# Patient Record
Sex: FEMALE | Race: BLACK OR AFRICAN AMERICAN | Employment: FULL TIME | URBAN - METROPOLITAN AREA
[De-identification: names, ages, dates, MRNs, and addresses within clinical notes are randomized per-mention and may not be internally consistent; named-entity substitution may affect disease eponyms.]

---

## 2017-04-04 ENCOUNTER — TELEPHONE (OUTPATIENT)
Dept: OBGYN CLINIC | Facility: CLINIC | Age: 47
End: 2017-04-04

## 2017-04-14 ENCOUNTER — OFFICE VISIT (OUTPATIENT)
Dept: OBGYN CLINIC | Facility: CLINIC | Age: 47
End: 2017-04-14

## 2017-04-14 VITALS
SYSTOLIC BLOOD PRESSURE: 123 MMHG | BODY MASS INDEX: 35 KG/M2 | DIASTOLIC BLOOD PRESSURE: 80 MMHG | HEART RATE: 86 BPM | WEIGHT: 219 LBS

## 2017-04-14 DIAGNOSIS — Z12.39 BREAST CANCER SCREENING: Primary | ICD-10-CM

## 2017-04-14 DIAGNOSIS — Z11.3 SCREENING FOR STD (SEXUALLY TRANSMITTED DISEASE): ICD-10-CM

## 2017-04-14 DIAGNOSIS — N89.8 VAGINAL IRRITATION: ICD-10-CM

## 2017-04-14 PROCEDURE — 99214 OFFICE O/P EST MOD 30 MIN: CPT | Performed by: OBSTETRICS & GYNECOLOGY

## 2017-04-14 RX ORDER — FLUCONAZOLE 150 MG/1
150 TABLET ORAL
Qty: 2 TABLET | Refills: 2 | Status: SHIPPED | OUTPATIENT
Start: 2017-04-14 | End: 2018-04-14

## 2017-04-14 RX ORDER — ACETAMINOPHEN AND CODEINE PHOSPHATE 120; 12 MG/5ML; MG/5ML
0.35 SOLUTION ORAL DAILY
Qty: 1 PACKAGE | Refills: 11 | Status: SHIPPED | OUTPATIENT
Start: 2017-04-14 | End: 2018-04-14

## 2017-04-14 RX ORDER — VALACYCLOVIR HYDROCHLORIDE 500 MG/1
500 TABLET, FILM COATED ORAL 2 TIMES DAILY
Qty: 6 TABLET | Refills: 3 | Status: SHIPPED | OUTPATIENT
Start: 2017-04-14 | End: 2017-04-17

## 2017-04-14 NOTE — PROGRESS NOTES
Gavino Hernandez is a 48year old female  Patient's last menstrual period was 2017. Patient presents with:  Gyn Exam: annual exam and mammo order   Patient presents today for problem visit.  She was seen in  for annual exam. She is concerned tobacco: Not on file    Alcohol Use: Yes  0.0 oz/week    0 Standard drinks or equivalent per week         Comment: 1-5 glasses of wine per week; occasionally    Drug Use: No    Comment: none    Sexual Activity: Yes    Partners: Male    Birth Control/ Prote nondistended, no masses  Skin/Hair: no unusual rashes or bruises  Extremities: no edema, no cyanosis  Psychiatric:  Oriented to time, place, person and situation.  Appropriate mood and affect    Pelvic Exam:  External Genitalia: normal appearance, hair dist yeast  3. Mammogram ordered today  4. Last pap smear 2016 and negative  5. Started on Mini-pill for menses that are light but irregular. This will make her traveling easier. Menses are spaced and likely due to menopause  6.  Vaginal culture today and STD sc

## 2017-04-17 ENCOUNTER — TELEPHONE (OUTPATIENT)
Dept: OBGYN CLINIC | Facility: CLINIC | Age: 47
End: 2017-04-17

## 2017-04-17 RX ORDER — METRONIDAZOLE 500 MG/1
500 TABLET ORAL 2 TIMES DAILY
Qty: 14 TABLET | Refills: 0 | Status: SHIPPED | OUTPATIENT
Start: 2017-04-17 | End: 2017-04-24

## 2017-04-17 NOTE — TELEPHONE ENCOUNTER
----- Message from Claudia Montano MD sent at 4/17/2017  9:09 AM CDT -----  Please let pt know that her GC/Ct was negative. Her vaginal culture was positive for BV. She will need Flagyl 500mg BID for 7 days.

## 2017-04-17 NOTE — TELEPHONE ENCOUNTER
Pt notified of results and rx. Pt advised to avoid ETOH while on tx and about 3 days after completing Flagyl.

## 2017-05-26 ENCOUNTER — TELEPHONE (OUTPATIENT)
Dept: OBGYN CLINIC | Facility: CLINIC | Age: 47
End: 2017-05-26

## 2017-05-26 NOTE — TELEPHONE ENCOUNTER
Pt calling to report that she started Ortho Micronor in April and since starting has been experiencing headaches, fatigue, and numbness in her right leg.  Pt stated that at first she thought the numbness in her leg was due to her work outs, but then it star

## 2017-05-26 NOTE — TELEPHONE ENCOUNTER
Please let patient know that leg numbness is not a side effect of micronor. She should see her PCP immediately as there is likely another cause of this. Some of it may be due to her traveling for long hours for work.  Headaches can be a side effect of any b

## 2017-05-26 NOTE — TELEPHONE ENCOUNTER
Had Severe headache, numbness and fatigue with her b/c, pt stop taking it last week.  Would like to know if she can take a different kind ( no symptoms  now )

## 2017-07-22 ENCOUNTER — HOSPITAL ENCOUNTER (OUTPATIENT)
Dept: MAMMOGRAPHY | Facility: HOSPITAL | Age: 47
Discharge: HOME | End: 2017-07-22
Attending: OBSTETRICS & GYNECOLOGY
Payer: COMMERCIAL

## 2017-07-22 DIAGNOSIS — Z12.39 BREAST CANCER SCREENING: ICD-10-CM

## 2017-07-22 PROCEDURE — 77067 SCR MAMMO BI INCL CAD: CPT | Performed by: OBSTETRICS & GYNECOLOGY

## 2017-07-24 ENCOUNTER — TELEPHONE (OUTPATIENT)
Dept: OBGYN CLINIC | Facility: CLINIC | Age: 47
End: 2017-07-24

## 2017-07-24 NOTE — TELEPHONE ENCOUNTER
Noted. Please apologize to patient for me and will discuss with radiology but not uncommon to need additional views.

## 2017-07-24 NOTE — TELEPHONE ENCOUNTER
Pt states she was contacted today by radiology about the follow up mammo. Pt would like [address] to know that when she had her initial mammo, she felt like it was an assembly line.   Pt states she was in and out in 5 minutes, and that it was completely painless

## 2017-07-24 NOTE — TELEPHONE ENCOUNTER
----- Message from Manuel Lyon MD sent at 7/23/2017  1:43 PM CDT -----  Please make sure patient knows she needs repeat images and these orders have been placed for mammogram

## 2017-08-04 ENCOUNTER — HOSPITAL ENCOUNTER (OUTPATIENT)
Dept: ULTRASOUND IMAGING | Facility: HOSPITAL | Age: 47
Discharge: HOME | End: 2017-08-04
Attending: OBSTETRICS & GYNECOLOGY
Payer: COMMERCIAL

## 2017-08-04 ENCOUNTER — HOSPITAL ENCOUNTER (OUTPATIENT)
Dept: MAMMOGRAPHY | Facility: HOSPITAL | Age: 47
Discharge: HOME | End: 2017-08-04
Attending: OBSTETRICS & GYNECOLOGY
Payer: COMMERCIAL

## 2017-08-04 DIAGNOSIS — R92.8 ABNORMAL MAMMOGRAM: ICD-10-CM

## 2017-08-04 PROCEDURE — 76642 ULTRASOUND BREAST LIMITED: CPT | Performed by: OBSTETRICS & GYNECOLOGY

## 2017-08-04 PROCEDURE — 77066 DX MAMMO INCL CAD BI: CPT | Performed by: OBSTETRICS & GYNECOLOGY

## 2017-10-30 ENCOUNTER — TELEPHONE (OUTPATIENT)
Dept: OBGYN CLINIC | Facility: CLINIC | Age: 47
End: 2017-10-30

## 2017-10-30 NOTE — TELEPHONE ENCOUNTER
RECEIVED REFILL REQUEST FROM [facility] FOR FLUCONAZOLE 150MG TAB AND FOR VALACYCLOVIR 500MG TAB. FLUCONAZOLE WAS PRESCRIBED ON 4-14-17 FOR 2 TABS WITH 2 REFILLS AND VALACYCLOVIR 500MG TABS, #6 WITH 3 REFILLS.

## 2017-10-30 NOTE — TELEPHONE ENCOUNTER
LMTCB.  NEED TO FIND OUT IF SHE USED ALL THE ORIGINAL REFILLS.   HOW MANY OUTBREAKS IN THE PAST YEAR?  (FORM IN BLACK BIN BY FAX)

## 2017-10-31 NOTE — TELEPHONE ENCOUNTER
Spoke with ROCHELLE (on call) in [place] absence. Verbal from G[facility] [phone] that pt may have refill of valtrex 500 mg #6 for outbreak. Called pt and informed her of recs. Apologized to pt for the delay in response.  Asked pt which pharmacy she needs rx sent to since she is o

## 2017-10-31 NOTE — TELEPHONE ENCOUNTER
Pt states her insurance requires after 2 refills to go through mail order. 3rd break right now and has not used all refills. [place] is [place] of [place]. pls adv.

## 2017-10-31 NOTE — TELEPHONE ENCOUNTER
Pt is returning nurse call- would like speak to dr Yessenia Umanzor  now. Pt is very upset to regarding her refills, will call the [address] today. She will write a yelp review, on the clinic.  Pt is very up set because she is having a break out- is out in [place] not feelin

## 2017-11-18 RX ORDER — VALACYCLOVIR HYDROCHLORIDE 500 MG/1
TABLET, FILM COATED ORAL
Qty: 6 TABLET | Refills: 0 | OUTPATIENT
Start: 2017-11-18

## 2018-04-14 ENCOUNTER — OFFICE VISIT (OUTPATIENT)
Dept: OBGYN CLINIC | Facility: CLINIC | Age: 48
End: 2018-04-14

## 2018-04-14 VITALS
DIASTOLIC BLOOD PRESSURE: 79 MMHG | SYSTOLIC BLOOD PRESSURE: 120 MMHG | HEART RATE: 73 BPM | WEIGHT: 224 LBS | HEIGHT: 67.25 IN | BODY MASS INDEX: 34.75 KG/M2

## 2018-04-14 DIAGNOSIS — Z12.31 ENCOUNTER FOR SCREENING MAMMOGRAM FOR BREAST CANCER: ICD-10-CM

## 2018-04-14 DIAGNOSIS — Z01.419 ENCOUNTER FOR GYNECOLOGICAL EXAMINATION WITHOUT ABNORMAL FINDING: Primary | ICD-10-CM

## 2018-04-14 PROCEDURE — 99396 PREV VISIT EST AGE 40-64: CPT | Performed by: OBSTETRICS & GYNECOLOGY

## 2018-04-14 RX ORDER — SIMETHICONE 125 MG
CAPSULE ORAL
COMMUNITY
Start: 2016-10-31 | End: 2018-04-14

## 2018-04-14 RX ORDER — FLUOCINOLONE ACETONIDE 0.25 MG/G
OINTMENT TOPICAL
COMMUNITY
Start: 2018-01-05

## 2018-04-14 RX ORDER — VALACYCLOVIR HYDROCHLORIDE 500 MG/1
500 TABLET, FILM COATED ORAL 2 TIMES DAILY
Qty: 12 TABLET | Refills: 12 | Status: SHIPPED | OUTPATIENT
Start: 2018-04-14 | End: 2018-04-17

## 2018-04-14 RX ORDER — FLUCONAZOLE 150 MG/1
150 TABLET ORAL
Qty: 2 TABLET | Refills: 10 | Status: SHIPPED | OUTPATIENT
Start: 2018-04-14

## 2018-04-14 RX ORDER — KETOCONAZOLE 20 MG/ML
SHAMPOO TOPICAL
COMMUNITY
Start: 2018-01-05

## 2018-04-14 NOTE — PROGRESS NOTES
Well Woman Exam    HPI:  The patient is a 50yo female who presents for annual exam. She has been traveling even more for work. She was in [address] [place] [address] this week and goes to [facility] later this month. Buying a Loft in the [address].  She is doing well with no menses Problem Relation Age of Onset   • Diabetes Mother    • Hypertension Mother    • Cancer Mother      cervical   • Heart Attack Mother      myocardial infarction   • Diabetes Maternal Grandmother    • Diabetes Brother    • Colon Cancer Paternal Grandfather ascultation bilaterally   Lymphatic:no abnormal supraclavicular or axillary adenopathy is noted  Breast: normal without palpable masses, tenderness, asymmetry, nipple discharge, nipple retraction or skin changes  Abdomen:  soft, nontender, nondistended, no

## 2018-07-28 ENCOUNTER — HOSPITAL ENCOUNTER (OUTPATIENT)
Dept: MAMMOGRAPHY | Facility: HOSPITAL | Age: 48
Discharge: HOME | End: 2018-07-28
Attending: OBSTETRICS & GYNECOLOGY
Payer: COMMERCIAL

## 2018-07-28 DIAGNOSIS — Z12.31 ENCOUNTER FOR SCREENING MAMMOGRAM FOR BREAST CANCER: ICD-10-CM

## 2018-07-28 PROCEDURE — 77067 SCR MAMMO BI INCL CAD: CPT | Performed by: OBSTETRICS & GYNECOLOGY

## 2018-07-31 ENCOUNTER — PATIENT MESSAGE (OUTPATIENT)
Dept: OBGYN CLINIC | Facility: CLINIC | Age: 48
End: 2018-07-31

## 2018-07-31 RX ORDER — VALACYCLOVIR HYDROCHLORIDE 500 MG/1
500 TABLET, FILM COATED ORAL 2 TIMES DAILY
Qty: 10 TABLET | Refills: 5 | Status: SHIPPED | OUTPATIENT
Start: 2018-07-31 | End: 2019-06-19

## 2018-07-31 RX ORDER — FLUCONAZOLE 150 MG/1
150 TABLET ORAL
Qty: 2 TABLET | Refills: 10
Start: 2018-07-31

## 2018-07-31 NOTE — TELEPHONE ENCOUNTER
Patient sent valtrex and if she continues to have outbreaks will need to be on suppression dosing. At this time it sounds as though that was prelim research and sensationalized by the media.  There are many links to alzheimers that are being studied and H

## 2018-07-31 NOTE — TELEPHONE ENCOUNTER
From: Olegario Nguyen  To: Tess Gtz MD  Sent: 7/31/2018 3:29 PM CDT  Subject: Prescription Question    Hi Dr. Luz Elena Newell,  Would you mind refilling my Valtrex Rx through [facility]? I've been under a lot of stress and I'm noticing more outbreaks.    A

## 2019-06-19 ENCOUNTER — OFFICE VISIT (OUTPATIENT)
Dept: OBGYN CLINIC | Facility: CLINIC | Age: 49
End: 2019-06-19
Payer: COMMERCIAL

## 2019-06-19 VITALS
SYSTOLIC BLOOD PRESSURE: 115 MMHG | DIASTOLIC BLOOD PRESSURE: 78 MMHG | BODY MASS INDEX: 32.32 KG/M2 | WEIGHT: 208.38 LBS | HEIGHT: 67.5 IN | HEART RATE: 66 BPM

## 2019-06-19 DIAGNOSIS — Z12.31 ENCOUNTER FOR SCREENING MAMMOGRAM FOR BREAST CANCER: ICD-10-CM

## 2019-06-19 DIAGNOSIS — Z01.419 ENCOUNTER FOR GYNECOLOGICAL EXAMINATION WITHOUT ABNORMAL FINDING: Primary | ICD-10-CM

## 2019-06-19 DIAGNOSIS — Z12.4 CERVICAL CANCER SCREENING: ICD-10-CM

## 2019-06-19 DIAGNOSIS — A60.04 HERPES SIMPLEX VULVOVAGINITIS: ICD-10-CM

## 2019-06-19 PROCEDURE — 99396 PREV VISIT EST AGE 40-64: CPT | Performed by: OBSTETRICS & GYNECOLOGY

## 2019-06-19 RX ORDER — VALACYCLOVIR HYDROCHLORIDE 500 MG/1
500 TABLET, FILM COATED ORAL 2 TIMES DAILY
Qty: 10 TABLET | Refills: 5 | Status: SHIPPED | OUTPATIENT
Start: 2019-06-19

## 2019-06-19 RX ORDER — VALACYCLOVIR HYDROCHLORIDE 1 G/1
TABLET, FILM COATED ORAL
Refills: 2 | COMMUNITY
Start: 2019-02-08

## 2019-06-19 NOTE — PROGRESS NOTES
Well Woman Exam    HPI:  The patient is a 50yo female who presents today for annual exam. She quit her job and will not be traveling as much. She is going to work [employer] with [employer]. She has no complaints. No HSV outbreaks.  She feels well with occasional.    Rev Partners: Male        Comment: none    Lifestyle      Physical activity:        Days per week: Not on file        Minutes per session: Not on file      Stress: Not on file    Relationships      Social connections:        Talks on phone: Not on file valACYclovir HCl 500 MG Oral Tab, Take 1 tablet (500 mg total) by mouth 2 (two) times daily. , Disp: 10 tablet, Rfl: 5  •  Fluocinolone Acetonide 0.025 % External Ointment, Apply topically. , Disp: , Rfl:   •  Ketoconazole 2 % External Shampoo, Apply topical normal in size, location, without lesions and prolapse  Bladder:  No fullness, masses or tenderness  Vagina:  Normal appearance without lesions, no abnormal discharge  Cervix:  Normal without tenderness on motion  Uterus: normal in size, contour, position,

## 2019-09-28 ENCOUNTER — HOSPITAL ENCOUNTER (OUTPATIENT)
Dept: MAMMOGRAPHY | Facility: HOSPITAL | Age: 49
Discharge: HOME | End: 2019-09-28
Attending: OBSTETRICS & GYNECOLOGY
Payer: COMMERCIAL

## 2019-09-28 DIAGNOSIS — Z12.31 ENCOUNTER FOR SCREENING MAMMOGRAM FOR BREAST CANCER: ICD-10-CM

## 2019-09-28 PROCEDURE — 77067 SCR MAMMO BI INCL CAD: CPT | Performed by: OBSTETRICS & GYNECOLOGY

## 2019-09-28 PROCEDURE — 77063 BREAST TOMOSYNTHESIS BI: CPT | Performed by: OBSTETRICS & GYNECOLOGY

## 2019-10-03 ENCOUNTER — HOSPITAL ENCOUNTER (OUTPATIENT)
Dept: ULTRASOUND IMAGING | Facility: HOSPITAL | Age: 49
Discharge: HOME | End: 2019-10-03
Attending: OBSTETRICS & GYNECOLOGY
Payer: COMMERCIAL

## 2019-10-03 ENCOUNTER — HOSPITAL ENCOUNTER (OUTPATIENT)
Dept: MAMMOGRAPHY | Facility: HOSPITAL | Age: 49
Discharge: HOME | End: 2019-10-03
Attending: OBSTETRICS & GYNECOLOGY
Payer: COMMERCIAL

## 2019-10-03 DIAGNOSIS — R92.8 ABNORMAL MAMMOGRAM: ICD-10-CM

## 2019-10-03 PROCEDURE — 77066 DX MAMMO INCL CAD BI: CPT | Performed by: OBSTETRICS & GYNECOLOGY

## 2019-10-03 PROCEDURE — 77062 BREAST TOMOSYNTHESIS BI: CPT | Performed by: OBSTETRICS & GYNECOLOGY

## 2019-10-03 PROCEDURE — 76642 ULTRASOUND BREAST LIMITED: CPT | Performed by: OBSTETRICS & GYNECOLOGY

## 2019-10-17 ENCOUNTER — TELEPHONE (OUTPATIENT)
Dept: OBGYN CLINIC | Facility: CLINIC | Age: 49
End: 2019-10-17

## 2019-10-17 DIAGNOSIS — N63.0 LUMP OR MASS IN BREAST: Primary | ICD-10-CM

## 2019-10-17 NOTE — TELEPHONE ENCOUNTER
----- Message from Donavan Tompkins MD sent at 10/8/2019 10:04 AM CDT -----  Pt needs diagnostic mammogram right breast in 6 months. She need US bilateral breasts in 6 months. Please let patient know and place orders.

## 2019-10-17 NOTE — TELEPHONE ENCOUNTER
Patient returning call, stated after about an hour she will be boarding a plane, asked if call back will be soon

## 2019-10-25 NOTE — TELEPHONE ENCOUNTER
Notified pt of results and recs below. Pt states she is upset because she received a bill from [facility] for $1400.00.   States when she went in for the mammo she was informed by the tech that they have new 2D &3D machines and was told \"don't be surprised if you

## 2019-10-30 NOTE — TELEPHONE ENCOUNTER
Noted, I will sent this information to Sheela Richardson, Manager of radiology to look into further.

## 2020-05-29 ENCOUNTER — HOSPITAL ENCOUNTER (OUTPATIENT)
Dept: MAMMOGRAPHY | Facility: HOSPITAL | Age: 50
Discharge: HOME | End: 2020-05-29
Attending: OBSTETRICS & GYNECOLOGY
Payer: COMMERCIAL

## 2020-05-29 ENCOUNTER — HOSPITAL ENCOUNTER (OUTPATIENT)
Dept: ULTRASOUND IMAGING | Facility: HOSPITAL | Age: 50
Discharge: HOME | End: 2020-05-29
Attending: OBSTETRICS & GYNECOLOGY
Payer: COMMERCIAL

## 2020-05-29 DIAGNOSIS — N63.0 LUMP OR MASS IN BREAST: ICD-10-CM

## 2020-05-29 PROCEDURE — 77061 BREAST TOMOSYNTHESIS UNI: CPT | Performed by: OBSTETRICS & GYNECOLOGY

## 2020-05-29 PROCEDURE — 77065 DX MAMMO INCL CAD UNI: CPT | Performed by: OBSTETRICS & GYNECOLOGY

## 2023-11-28 NOTE — TELEPHONE ENCOUNTER
PT CALLING BACK SHE STATE SHE'S OUT OF TOWN / SHE WOULD LIKE TO SPEAK WITH SOMEONE TODAY / PLS ADV PROCEDURES:  Dilation and curettage, uterus 28-Nov-2023 10:53:13  Truman Cleary  Hysteroscopy 28-Nov-2023 10:53:19  Truman Cleary
